# Patient Record
Sex: MALE | Race: WHITE | HISPANIC OR LATINO | URBAN - METROPOLITAN AREA
[De-identification: names, ages, dates, MRNs, and addresses within clinical notes are randomized per-mention and may not be internally consistent; named-entity substitution may affect disease eponyms.]

---

## 2018-03-22 ENCOUNTER — EMERGENCY (EMERGENCY)
Facility: HOSPITAL | Age: 23
LOS: 1 days | Discharge: HOME | End: 2018-03-22
Attending: STUDENT IN AN ORGANIZED HEALTH CARE EDUCATION/TRAINING PROGRAM
Payer: COMMERCIAL

## 2018-03-22 VITALS
HEIGHT: 72 IN | DIASTOLIC BLOOD PRESSURE: 70 MMHG | SYSTOLIC BLOOD PRESSURE: 144 MMHG | TEMPERATURE: 98 F | OXYGEN SATURATION: 97 % | WEIGHT: 190.04 LBS | HEART RATE: 58 BPM | RESPIRATION RATE: 16 BRPM

## 2018-03-22 PROCEDURE — 99285 EMERGENCY DEPT VISIT HI MDM: CPT

## 2018-03-22 PROCEDURE — 99284 EMERGENCY DEPT VISIT MOD MDM: CPT | Mod: 25

## 2018-03-22 NOTE — ED ADULT NURSE NOTE - OBJECTIVE STATEMENT
Pt received in cdu11 in yellow gown with no belongings @ bedside, pt a&ox3 c/o palpitations and AMS per brother @ bedside, pt admits to using LSD tonight around 1900 and states "I feel like I overdosed". Per brother @ bedside pt was not acting normal was forgetful and nauseous Prior to EMS arrival. MAEx4, rr even and unlabored, in no apparent distress. Updated on POC, pending md orders @ this time, will continue to monitor and reassess

## 2018-03-22 NOTE — ED PROVIDER NOTE - CONSTITUTIONAL, MLM
Telephone call return, instructed the patient to stop Prinivil 20 mg patient's to start Cozaar 50 mg   normal... Well appearing, well nourished, awake, alert, oriented to person, place, time/situation and in no apparent distress.

## 2018-03-22 NOTE — ED ADULT TRIAGE NOTE - CHIEF COMPLAINT QUOTE
I took LSD. and I felt my heart racing. pt states he felt as though he was sleeping. As per brother pt was parinoid, dry heaving and diaphoretic. Pt AOX4 very anxious.

## 2018-03-22 NOTE — ED ADULT NURSE NOTE - DISCHARGE TEACHING
performed by rn, resources provided for polysubstance abuse, go to ed with new or worsening s/s, f/u with pmd

## 2018-03-22 NOTE — ED PROVIDER NOTE - OBJECTIVE STATEMENT
23 yo male presents for evaluation 21 yo male presents for evaluation after using LSD last night. Patient states that he used LSD for the 2nd time in a year. Patient states after ingesting it he felt weird, then dizzy, then angry and was having hallucinations. The patient called his family to pick him up. When he continued to have symptoms they called 911. It took EMS about 1.5-2 hours to get to them. Patient and his brother at bedside states he is getting better.
